# Patient Record
Sex: MALE | Race: WHITE | NOT HISPANIC OR LATINO | URBAN - METROPOLITAN AREA
[De-identification: names, ages, dates, MRNs, and addresses within clinical notes are randomized per-mention and may not be internally consistent; named-entity substitution may affect disease eponyms.]

---
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
## 2024-03-31 ENCOUNTER — OFFICE VISIT (OUTPATIENT)
Dept: URGENT CARE | Facility: CLINIC | Age: 24
End: 2024-03-31
Payer: COMMERCIAL

## 2024-03-31 VITALS
HEIGHT: 67 IN | HEART RATE: 108 BPM | BODY MASS INDEX: 28.72 KG/M2 | OXYGEN SATURATION: 98 % | DIASTOLIC BLOOD PRESSURE: 74 MMHG | RESPIRATION RATE: 16 BRPM | SYSTOLIC BLOOD PRESSURE: 118 MMHG | TEMPERATURE: 100.7 F | WEIGHT: 183 LBS

## 2024-03-31 DIAGNOSIS — R09.81 NASAL CONGESTION WITH RHINORRHEA: ICD-10-CM

## 2024-03-31 DIAGNOSIS — R50.9 FEVER, UNSPECIFIED FEVER CAUSE: ICD-10-CM

## 2024-03-31 DIAGNOSIS — J34.89 NASAL CONGESTION WITH RHINORRHEA: ICD-10-CM

## 2024-03-31 DIAGNOSIS — H66.001 ACUTE SUPPURATIVE OTITIS MEDIA OF RIGHT EAR WITHOUT SPONTANEOUS RUPTURE OF TYMPANIC MEMBRANE, RECURRENCE NOT SPECIFIED: ICD-10-CM

## 2024-03-31 RX ORDER — FLUTICASONE PROPIONATE 50 MCG
1 SPRAY, SUSPENSION (ML) NASAL DAILY
Qty: 16 G | Refills: 0 | Status: SHIPPED | OUTPATIENT
Start: 2024-03-31

## 2024-03-31 RX ORDER — AMOXICILLIN AND CLAVULANATE POTASSIUM 875; 125 MG/1; MG/1
1 TABLET, FILM COATED ORAL 2 TIMES DAILY
Qty: 14 TABLET | Refills: 0 | Status: SHIPPED | OUTPATIENT
Start: 2024-03-31 | End: 2024-04-07

## 2024-03-31 ASSESSMENT — ENCOUNTER SYMPTOMS
DIARRHEA: 0
FEVER: 1
VOMITING: 0
NAUSEA: 0
COUGH: 1
ABDOMINAL PAIN: 0
SORE THROAT: 0
CONSTIPATION: 0
CHILLS: 0
WHEEZING: 0
HEADACHES: 1
SHORTNESS OF BREATH: 0
PALPITATIONS: 0

## 2024-03-31 NOTE — LETTER
March 31, 2024    To Whom It May Concern:         This is confirmation that Malorie Romero attended his scheduled appointment with Zulma Correia P.A.-C. on 3/31/24. Please excuse work absences through 4/3/24 for medical reasons. Malorie can return to work without restrictions on 4/24/24 or earlier as long as symptoms have improved/resolved and there has been no fever for 24 hours.           Sincerely,      Zulma Correia P.A.-C.  [phone]

## 2024-04-01 NOTE — PROGRESS NOTES
Subjective     Malorie Rmoero is a 23 y.o. male who presents with Headache (X 1 day/ ear pain ( right ear ) )            Malorie is a 23 y.o. male who presents to urgent care with nasal congestion, ear pain, headache and cough.  Symptoms initially started a few days ago.  Patient developed ear pain yesterday which is worsened today.  Patient initially thought nasal congestion was secondary to allergies and has been taking antihistamine which has not been helping.  Patient has not noticed fever but says that he now has a fever here in clinic.  No shortness of breath/wheezing.  Patient presents with his significant other who was recently sick with cold-like symptoms as well.  Patient's significant other states that she tested negative for COVID.        Review of Systems   Constitutional:  Positive for fever. Negative for chills and malaise/fatigue.   HENT:  Positive for congestion and ear pain. Negative for sore throat.    Respiratory:  Positive for cough. Negative for shortness of breath and wheezing.    Cardiovascular:  Negative for chest pain and palpitations.   Gastrointestinal:  Negative for abdominal pain, constipation, diarrhea, nausea and vomiting.   Neurological:  Positive for headaches.   All other systems reviewed and are negative.             Objective     There were no vitals taken for this visit.     Physical Exam  Vitals reviewed.   Constitutional:       General: He is not in acute distress.     Appearance: Normal appearance. He is not toxic-appearing.   HENT:      Head: Normocephalic and atraumatic.      Right Ear: Ear canal and external ear normal.      Left Ear: Tympanic membrane, ear canal and external ear normal.      Nose: Congestion present.      Mouth/Throat:      Mouth: Mucous membranes are moist.      Pharynx: Oropharynx is clear. Posterior oropharyngeal erythema present. No oropharyngeal exudate.   Eyes:      Extraocular Movements: Extraocular movements intact.      Conjunctiva/sclera:  Conjunctivae normal.      Pupils: Pupils are equal, round, and reactive to light.   Cardiovascular:      Rate and Rhythm: Normal rate and regular rhythm.   Pulmonary:      Effort: Pulmonary effort is normal.      Breath sounds: Normal breath sounds.   Skin:     General: Skin is warm and dry.   Neurological:      General: No focal deficit present.      Mental Status: He is alert.                             Assessment & Plan        1. Acute suppurative otitis media of right ear without spontaneous rupture of tympanic membrane, recurrence not specified  - amoxicillin-clavulanate (AUGMENTIN) 875-125 MG Tab; Take 1 Tablet by mouth 2 times a day for 7 days.  Dispense: 14 Tablet; Refill: 0    2. Fever, unspecified fever cause  - Febrile in clinic. Temp os 100.7F. Systemic symptom.  - Declined viral testing in clinic.    3. Nasal congestion with rhinorrhea  - fluticasone (FLONASE) 50 MCG/ACT nasal spray; Administer 1 Spray into affected nostril(S) every day.  Dispense: 16 g; Refill: 0       Differential diagnoses, supportive care measures and indications for immediate follow-up discussed with patient. Pathogenesis of diagnosis discussed including typical length and natural progression.      Instructed to return to urgent care or nearest emergency department if symptoms fail to improve, for any change in condition, further concerns, or new concerning symptoms.    Patient states understanding and agrees with the plan of care and discharge instructions.

## 2025-01-12 ENCOUNTER — OFFICE VISIT (OUTPATIENT)
Dept: URGENT CARE | Facility: CLINIC | Age: 25
End: 2025-01-12
Payer: COMMERCIAL

## 2025-01-12 VITALS
HEART RATE: 90 BPM | SYSTOLIC BLOOD PRESSURE: 122 MMHG | HEIGHT: 68 IN | TEMPERATURE: 98.6 F | BODY MASS INDEX: 27.89 KG/M2 | RESPIRATION RATE: 18 BRPM | DIASTOLIC BLOOD PRESSURE: 80 MMHG | WEIGHT: 184 LBS | OXYGEN SATURATION: 99 %

## 2025-01-12 DIAGNOSIS — R11.2 NAUSEA AND VOMITING, UNSPECIFIED VOMITING TYPE: ICD-10-CM

## 2025-01-12 PROCEDURE — 3079F DIAST BP 80-89 MM HG: CPT

## 2025-01-12 PROCEDURE — 3074F SYST BP LT 130 MM HG: CPT

## 2025-01-12 PROCEDURE — 99213 OFFICE O/P EST LOW 20 MIN: CPT

## 2025-01-12 RX ORDER — ONDANSETRON 4 MG/1
4 TABLET, ORALLY DISINTEGRATING ORAL EVERY 6 HOURS PRN
Qty: 20 TABLET | Refills: 0 | Status: SHIPPED | OUTPATIENT
Start: 2025-01-12

## 2025-01-12 ASSESSMENT — ENCOUNTER SYMPTOMS
DIARRHEA: 0
NAUSEA: 1
FEVER: 0
VOMITING: 1
ABDOMINAL PAIN: 0

## 2025-06-26 ENCOUNTER — OFFICE VISIT (OUTPATIENT)
Dept: URGENT CARE | Facility: CLINIC | Age: 25
End: 2025-06-26
Payer: COMMERCIAL

## 2025-06-26 VITALS
TEMPERATURE: 99 F | SYSTOLIC BLOOD PRESSURE: 104 MMHG | BODY MASS INDEX: 28.75 KG/M2 | DIASTOLIC BLOOD PRESSURE: 62 MMHG | WEIGHT: 189.7 LBS | HEIGHT: 68 IN | OXYGEN SATURATION: 98 % | HEART RATE: 82 BPM | RESPIRATION RATE: 13 BRPM

## 2025-06-26 DIAGNOSIS — S61.412A LACERATION OF LEFT HAND WITHOUT FOREIGN BODY, INITIAL ENCOUNTER: Primary | ICD-10-CM

## 2025-06-27 NOTE — PROGRESS NOTES
Chief Complaint   Patient presents with    Laceration     Pt has a laceration on the right hand x today           History of Present Illness  The patient is a 24-year-old male who presents for evaluation of a laceration on his left hand. He is accompanied by his girlfriend.    He sustained the injury while attempting to open a new tool in the parking lot of his workplace, where he is employed as a . The incident occurred on 04/2025, when the knife he was using slipped and caused a deep cut on his left hand. He promptly cleaned the wound and believes it is healing well. He is uncertain about the status of his tetanus vaccination, with his last known update being in 2013. He is right-handed.    SOCIAL HISTORY  He works as a .         ROS:    No severe shortness of breath   No Cardiac like chest pain, as discussed   As otherwise stated in HPI    Medical/SX/ Social History:  Reviewed per chart    Pertinent Medications:    Medications Ordered Prior to Encounter[1]     Allergies:    Patient has no known allergies.     Problem list, medications, and allergies reviewed by myself today in Epic     Physical Exam:    Vitals:    06/26/25 1827   BP: 104/62   Pulse: 82   Resp: 13   Temp: 37.2 °C (99 °F)   SpO2: 98%             Physical Exam  Constitutional:       Appearance: Normal appearance.   Musculoskeletal:        Hands:    Neurological:      Mental Status: He is alert.          Medical Decision making and plan :  I personally reviewed prior external notes and test results pertinent to today's visit. Pt is clinically stable at today's acute urgent care visit.  Patient appears nontoxic with no acute distress noted. Appropriate for outpatient care at this time.    Pleasant 24 y.o. male presented clinic with:     Assessment & Plan  1. Laceration on the left hand.  - The laceration is deep and located in an area that may reopen with fist movement.  - The wound was thoroughly cleaned and numbed before suturing.  -  He was advised to keep the wound covered, clean, and dry while at work. After 24 hours, he should wash the wound twice daily with warm soapy water and avoid using hydrogen peroxide. A small amount of ointment can be applied to maintain a balance between moisture and dryness.  - A tetanus vaccine will be administered today. If signs of infection such as increased redness or swelling occur, he should seek medical attention immediately.    Follow-up  - The patient will follow up in 7 days for suture removal.    PROCEDURE  Procedure: Suturing of laceration on the left hand    All questions were answered and agreement to proceed was given after the following Pre-Procedure details were reviewed:  - Risks and Benefits: Discussed the risk of infection, the benefit of proper wound closure, and the potential for better healing.  - Alternative Options: Discussed the possibility of using glue and Steri-Strips, but noted the concern of the wound reopening due to its location.  - Side effects: Discussed potential side effects including infection, scarring, and the need for follow-up care.  - Consent: Verbal consent obtained from the patient to proceed with suturing.    Intra-Procedure:  - Time-Out: Confirmed patient identity, procedure, and site.  - Site Preparation: The laceration site was thoroughly cleaned.  - Anesthesia: Local anesthesia was administered to numb the area.  - Hemostasis: Achieved during the procedure.  - Closure: The laceration was closed with 2 stitches.  - Dressing: Applied a small amount of ointment and covered the wound with a dressing.    Post-Procedure:  - Tolerance Level: The patient tolerated the procedure well.  - Home Care Instructions: Advised to keep the wound clean and dry, wash it twice a day with warm soapy water after 24 hours, avoid hydrogen peroxide, use a small amount of antibacterial ointment, and return in 7 days for suture removal. Advised to monitor for signs of infection such as  increased redness or swelling and to return sooner if these occur.      Shared decision-making was utilized with patient for treatment plan. Medication discussed included indication for use and the potential benefits and side effects. Education was provided regarding the aforementioned assessments.  Differential Diagnosis, natural history, and supportive care discussed. All of the patient's questions were answered to their satisfaction at the time of discharge. Patient was encouraged to monitor symptoms closely. Those signs and symptoms which would warrant concern and mandate seeking a higher level of service through the emergency department discussed at length.  Patient stated agreement and understanding of this plan of care.    Disposition:  Home in stable condition     Voice Recognition Disclaimer:  Portions of this document were created using voice recognition software and [employer] technology provided by [facility].  Patient was informed of [email or web address] technology being used.  The software does have a chance of producing errors of grammar and possibly content. I have made every reasonable attempt to correct obvious errors, but there could be errors of grammar and possibly content that I did not discover before finalizing the documentation.    Jennifer Brown, A.P.R.N.        [1]   Current Outpatient Medications on File Prior to Visit   Medication Sig Dispense Refill    ondansetron (ZOFRAN ODT) 4 MG TABLET DISPERSIBLE Take 1 Tablet by mouth every 6 hours as needed for Nausea/Vomiting. (Patient not taking: Reported on 6/26/2025) 20 Tablet 0    fluticasone (FLONASE) 50 MCG/ACT nasal spray Administer 1 Spray into affected nostril(S) every day. (Patient not taking: Reported on 6/26/2025) 16 g 0    clindamycin-benzoyl peroxide (BENZACLIN) gel Apply to affected area BID prn (Patient not taking: Reported on 6/26/2025) 50 g 2    minocycline (DYNACIN) 100 MG tablet Take 1 Tab by mouth 2 times a day. (Patient not taking: Reported on  6/26/2025) 60 Tab 1     No current facility-administered medications on file prior to visit.

## 2025-07-03 ENCOUNTER — OFFICE VISIT (OUTPATIENT)
Dept: URGENT CARE | Facility: CLINIC | Age: 25
End: 2025-07-03
Payer: COMMERCIAL

## 2025-07-03 VITALS
RESPIRATION RATE: 15 BRPM | SYSTOLIC BLOOD PRESSURE: 114 MMHG | TEMPERATURE: 98.3 F | WEIGHT: 185 LBS | HEIGHT: 68 IN | OXYGEN SATURATION: 98 % | DIASTOLIC BLOOD PRESSURE: 80 MMHG | BODY MASS INDEX: 28.04 KG/M2 | HEART RATE: 81 BPM

## 2025-07-03 DIAGNOSIS — Z48.02 VISIT FOR SUTURE REMOVAL: Primary | ICD-10-CM

## 2025-07-03 PROCEDURE — 3079F DIAST BP 80-89 MM HG: CPT | Performed by: NURSE PRACTITIONER

## 2025-07-03 PROCEDURE — 99212 OFFICE O/P EST SF 10 MIN: CPT | Performed by: NURSE PRACTITIONER

## 2025-07-03 PROCEDURE — 3074F SYST BP LT 130 MM HG: CPT | Performed by: NURSE PRACTITIONER

## 2025-07-04 NOTE — PROGRESS NOTES
"Subjective:   Malorie Romero is a 24 y.o. male who presents for Suture / Staple Removal (Left knuckles )      Suture / Staple Removal  The sutures were placed 7 to 10 days ago. He tried nothing since the wound repair. The treatment provided moderate relief. There has been no drainage from the wound. There is no redness present. There is no swelling present. There is no pain present. He has no difficulty moving the affected extremity or digit.       Review of Systems   Musculoskeletal:  Negative for joint pain.   Skin:         2 sutures intact right knuckle       Medications:    clindamycin-benzoyl peroxide  fluticasone  minocycline  ondansetron Tbdp    Allergies: Patient has no known allergies.    Problem List: Malorie Romero does not have any pertinent problems on file.    Surgical History:  No past surgical history on file.    Past Social Hx: Malorie Romero  reports that he has been smoking cigarettes. He has never used smokeless tobacco. He reports current alcohol use of about 1.8 oz of alcohol per week. He reports current drug use. Drugs: Inhaled and Marijuana.     Past Family Hx:  Malorie Romero family history includes Hypertension in his mother; Lung Disease in his maternal grandfather and maternal grandmother.     Problem list, medications, and allergies reviewed by myself today in Epic.     Objective:     /80   Pulse 81   Temp 36.8 °C (98.3 °F) (Temporal)   Resp 15   Ht 1.727 m (5' 8\")   Wt 83.9 kg (185 lb)   SpO2 98%   BMI 28.13 kg/m²     Physical Exam  Constitutional:       Appearance: Normal appearance. He is not ill-appearing or toxic-appearing.   HENT:      Head: Normocephalic.      Right Ear: External ear normal.      Left Ear: External ear normal.      Nose: Nose normal.      Mouth/Throat:      Lips: Pink.   Eyes:      General: Lids are normal.   Pulmonary:      Effort: Pulmonary effort is normal. No accessory muscle usage.   Musculoskeletal:    "   Cervical back: Full passive range of motion without pain.   Skin:     Findings: Laceration present.      Comments: 2 sutures intact right third phalanx at the CMP.  Wound well-approximated no signs of infection.   Neurological:      Mental Status: He is alert and oriented to person, place, and time.   Psychiatric:         Mood and Affect: Mood normal.         Thought Content: Thought content normal.         Assessment/Plan:     Diagnosis and associated orders:     1. Visit for suture removal           Comments/MDM:      2 sutures removed.  Wound healed appropriately.  Differential diagnosis, natural history, supportive care, and indications for immediate follow-up discussed.                Please note that this dictation was created using voice recognition software. I have made a reasonable attempt to correct obvious errors, but I expect that there are errors of grammar and possibly content that I did not discover before finalizing the note.    This note was electronically signed by Bob CHU.